# Patient Record
Sex: FEMALE | Race: BLACK OR AFRICAN AMERICAN | NOT HISPANIC OR LATINO | URBAN - METROPOLITAN AREA
[De-identification: names, ages, dates, MRNs, and addresses within clinical notes are randomized per-mention and may not be internally consistent; named-entity substitution may affect disease eponyms.]

---

## 2021-01-25 ENCOUNTER — EMERGENCY (EMERGENCY)
Facility: HOSPITAL | Age: 46
LOS: 1 days | Discharge: HOME | End: 2021-01-25
Admitting: EMERGENCY MEDICINE
Payer: COMMERCIAL

## 2021-01-25 VITALS
DIASTOLIC BLOOD PRESSURE: 76 MMHG | HEART RATE: 88 BPM | OXYGEN SATURATION: 99 % | TEMPERATURE: 98 F | RESPIRATION RATE: 18 BRPM | SYSTOLIC BLOOD PRESSURE: 133 MMHG

## 2021-01-25 DIAGNOSIS — Z20.822 CONTACT WITH AND (SUSPECTED) EXPOSURE TO COVID-19: ICD-10-CM

## 2021-01-25 LAB — SARS-COV-2 RNA SPEC QL NAA+PROBE: SIGNIFICANT CHANGE UP

## 2021-01-25 PROCEDURE — U0005: CPT

## 2021-01-25 PROCEDURE — U0003: CPT

## 2021-01-25 PROCEDURE — 99283 EMERGENCY DEPT VISIT LOW MDM: CPT

## 2021-01-25 NOTE — ED PROVIDER NOTE - PATIENT PORTAL LINK FT
You can access the FollowMyHealth Patient Portal offered by [facility] by registering at the following website: [email or web address]. By joining [facility]’s FollowMyHealth portal, you will also be able to view your health information using other applications (apps) compatible with our system.

## 2023-01-06 NOTE — ED PROVIDER NOTE - PRINCIPAL DIAGNOSIS
Marybel Bautista M.D.  [phone]            History and Physical       NAME:   Glo Obando   :   1954   MRN:   [id number]       Referring Physician:  Dr. Jin Pina Date: 2023 10:49 AM    Chief Complaint:  Colon cancer screening    History of Present Illness:  Patient is a 76 y.o. who is seen for colon cancer screening. Denies any ongoing GI complaints. PMH:  Past Medical History:   Diagnosis Date    Asthma     Breast cancer ([facility] [address]     Diabetes ([facility] [address]     Hx of seasonal allergies     Hypertension        PSH:  Past Surgical History:   Procedure Laterality Date    BIOPSY BREAST      X3    HX HYSTERECTOMY      HX TONSILLECTOMY      ME BREAST SURGERY PROCEDURE UNLISTED      bilateral breast lumpectomy       Allergies: Allergies   Allergen Reactions    Oxycodone-Acetaminophen Other (comments)     felt like \"out of body experience\"  Other reaction(s): Other (comments)  felt like \"out of body experience\"      Percodan [Oxycodone Hcl-Oxycodone-Asa] Other (comments)     felt like \"out of body experience\"       Home Medications:  Prior to Admission Medications   Prescriptions Last Dose Informant Patient Reported? Taking? Nebulizer & Compressor machine Unknown  No No   Sig: by Does Not Apply route. q4 hours with medication   albuterol (PROVENTIL VENTOLIN) 2.5 mg /3 mL (0.083 %) nebu Unknown  No No   Sig: 3 mL by Nebulization route every four (4) hours as needed for Wheezing. albuterol (ProAir HFA) 90 mcg/actuation inhaler 2022  No Yes   Sig: Take 1 puff every 4 hours as needed for shortness of breath or persistent nighttime cough. cholecalciferol (VITAMIN D3) (1000 Units /25 mcg) tablet 2022  Yes Yes   Sig: Take 1,000 Units by mouth daily. cyanocobalamin (VITAMIN B12) 500 mcg tablet 2022  Yes Yes   Sig: Take 1 Tab by mouth daily.  gummie   cyclobenzaprine (FLEXERIL) 5 mg tablet 2022  No Yes   Sig: Take 1 Tablet by mouth three (3) times daily Encounter for medical screening examination as needed for Muscle Spasm(s). fluticasone propionate (FLONASE) 50 mcg/actuation nasal spray 2023  Yes Yes   Si Sprays by Both Nostrils route daily as needed. lisinopril-hydroCHLOROthiazide (PRINZIDE, ZESTORETIC) 20-25 mg per tablet 2023  No Yes   Sig: Take 1 tablet by mouth once daily   loratadine (CLARITIN) 10 mg tablet 2022  Yes Yes   Sig: Take 10 mg by mouth daily. multivit with min-folic acid 801 mcg chew 2022  Yes Yes   Sig: Take  by mouth. naproxen (NAPROSYN) 250 mg tablet 2022  No Yes   Sig: Take 1 Tablet by mouth two (2) times daily as needed for Pain. Take with meals.       Facility-Administered Medications: None       Hospital Medications:  Current Facility-Administered Medications   Medication Dose Route Frequency    0.9% sodium chloride infusion  50 mL/hr IntraVENous CONTINUOUS    midazolam (VERSED) injection 0.25-5 mg  0.25-5 mg IntraVENous Multiple    naloxone (NARCAN) injection 0.4 mg  0.4 mg IntraVENous Multiple    flumazeniL (ROMAZICON) 0.1 mg/mL injection 0.2 mg  0.2 mg IntraVENous Multiple    simethicone (MYLICON) 16QT/8.9YF oral drops 80 mg  1.2 mL Oral Multiple    atropine injection 0.4 mg  0.4 mg IntraVENous ONCE PRN    EPINEPHrine (ADRENALIN) 0.1 mg/mL syringe 1 mg  1 mg Endoscopically ONCE PRN       Social History:  Social History     Tobacco Use    Smoking status: Former     Packs/day: 0.10     Years: 0.50     Pack years: 0.05     Types: Cigarettes     Quit date: 1974     Years since quittin.3    Smokeless tobacco: Never   Substance Use Topics    Alcohol use: Yes     Comment: infrequent       Family History:  Family History   Problem Relation Age of Onset    Hypertension Mother     Hypertension Father     Dementia Father     Stroke Father     Diabetes Sister              Review of Systems:      Constitutional: negative fever, negative chills, negative weight loss  Eyes:   negative visual changes  ENT:   negative sore throat, tongue or lip swelling  Respiratory:  negative cough, negative dyspnea  Cards:  negative for chest pain, palpitations, lower extremity edema  GI:   See HPI  :  negative for frequency, dysuria  Integument:  negative for rash and pruritus  Heme:  negative for easy bruising and gum/nose bleeding  Musculoskel: negative for myalgias,  back pain and muscle weakness  Neuro: negative for headaches, dizziness, vertigo  Psych:  negative for feelings of anxiety, depression       Objective:   Patient Vitals for the past 8 hrs:   BP Temp Pulse Resp SpO2 Height Weight   01/06/23 1047 (!) 156/80 98.2 °F (36.8 °C) 80 18 96 % -- --   01/06/23 1007 -- -- -- -- -- 5' 4\" (1.626 m) 89.2 kg (196 lb 10.4 oz)     No intake/output data recorded. No intake/output data recorded. EXAM:     NEURO-a&o   HEENT-wnl   LUNGS-clear    COR-regular rate and rhythym     ABD-soft , no tenderness, no rebound, good bs     EXT-no edema     Data Review     No results for input(s): WBC, HGB, HCT, PLT, HGBEXT, HCTEXT, PLTEXT in the last 72 hours. No results for input(s): NA, K, CL, CO2, BUN, CREA, GLU, PHOS, CA in the last 72 hours. No results for input(s): AP, TBIL, TP, ALB, GLOB, GGT, AML, LPSE in the last 72 hours. No lab exists for component: SGOT, GPT, AMYP, HLPSE  No results for input(s): INR, PTP, APTT, INREXT in the last 72 hours. Patient Active Problem List   Diagnosis Code    Asthma J45.909    HTN (hypertension) I10    AR (allergic rhinitis) J30.9    Alopecia, unspecified L65.9    Mammogram abnormal R92.8    Microscopic hematuria R31.29    Abnormal CPK R74.8    Controlled type 2 diabetes mellitus with complication, without long-term current use of insulin (HCC) E11.8    Knee pain M25.569    Low back pain M54.50    Severe obesity ([address] E66.01      Assessment:     Colon cancer screening   Plan:     Colonoscopy today.      Signed By: Marco Antonio Urrutia MD     1/6/2023  10:49 AM